# Patient Record
Sex: MALE | Race: AMERICAN INDIAN OR ALASKA NATIVE
[De-identification: names, ages, dates, MRNs, and addresses within clinical notes are randomized per-mention and may not be internally consistent; named-entity substitution may affect disease eponyms.]

---

## 2018-03-12 ENCOUNTER — HOSPITAL ENCOUNTER (EMERGENCY)
Dept: HOSPITAL 31 - C.ER | Age: 23
Discharge: HOME | End: 2018-03-12
Payer: COMMERCIAL

## 2018-03-12 VITALS
SYSTOLIC BLOOD PRESSURE: 121 MMHG | OXYGEN SATURATION: 100 % | HEART RATE: 59 BPM | DIASTOLIC BLOOD PRESSURE: 71 MMHG | TEMPERATURE: 98.3 F | RESPIRATION RATE: 16 BRPM

## 2018-03-12 VITALS — BODY MASS INDEX: 27 KG/M2

## 2018-03-12 DIAGNOSIS — M79.642: Primary | ICD-10-CM

## 2018-03-12 DIAGNOSIS — M79.641: ICD-10-CM

## 2018-03-12 NOTE — RAD
PROCEDURE:  Bilateral hand radiographs.



HISTORY:

left 5th finger, right 34d and 4thin finger



COMPARISON:

None available.



FINDINGS:



BONES:

Right Hand: No acute displaced fracture.



Left Hand: No acute displaced fracture. Postsurgical changes noted 

about the proximal 1st phalanx. 



JOINTS:

Right Hand: No dislocation.



Left Hand: No dislocation.



SOFT TISSUES:

Right Hand: Unremarkable. No evidence of radiopaque foreign body.



Left Hand: Unremarkable. No evidence of radiopaque foreign body.



OTHER FINDINGS:

None.



IMPRESSION:

No acute displaced fracture identified.

## 2018-03-12 NOTE — C.PDOC
History Of Present Illness


22 year old male presents to the ED for evaluation of B/L hand pain for the 

past 2 weeks. Patient reports he has been boxing, stopped hitting the heavy bag 

and was just shadow boxing but his pain has been worsening. Pain is mostly 

located on his left 5th digit and right 2nd and 3rd digits. Patient denies 

weakness, numbness, trauma, fall, injury. 


Time Seen by Provider: 03/12/18 12:07


Chief Complaint (Nursing): Finger,Hand,&Wrist


History Per: Patient


History/Exam Limitations: no limitations


Onset/Duration Of Symptoms: Days


Current Symptoms Are (Timing): Still Present


Quality: "Pain"


Recent travel outside of the [place]: No


Additional History Per: Patient





Past Medical History


Reviewed: Historical Data, Nursing Documentation, Vital Signs


Vital Signs: 


 Last Vital Signs











Temp  98.3 F   03/12/18 11:47


 


Pulse  59 L  03/12/18 11:47


 


Resp  16   03/12/18 11:47


 


BP  121/71   03/12/18 11:47


 


Pulse Ox  100   03/13/18 18:57














- Medical History


PMH: No Chronic Diseases


Surgical History: No Surg Hx


Family History: States: Unknown Family Hx





- Social History


Hx Alcohol Use: No


Hx Substance Use: No





- Immunization History


Hx Tetanus Toxoid Vaccination: No


Hx Influenza Vaccination: No


Hx Pneumococcal Vaccination: No





Review Of Systems


Constitutional: Negative for: Fever, Chills


Musculoskeletal: Positive for: Arm Pain


Skin: Negative for: Rash


Neurological: Negative for: Weakness, Numbness





Physical Exam





- Physical Exam


Appears: Non-toxic, No Acute Distress


Skin: Normal Color, Warm, Dry


Eye(s): bilateral: Normal Inspection


Extremity: Normal ROM, Tenderness (mild tenderness to right  proximal 2nd and 

3rd phalanges and left 5th finger), No Calf Tenderness, Capillary Refill (< 2 

seconds), No Deformity, No Swelling, Other (from at bilateral wrists)


Extremity: Bilateral: Atraumatic


Pulses: Left Radial: Normal, Right Radial: Normal


Neurological/Psych: Oriented x3, Normal Speech, Normal Cognition


Gait: Steady





ED Course And Treatment


O2 Sat by Pulse Oximetry: 100 (On RA)


Pulse Ox Interpretation: Normal





Medical Decision Making


Medical Decision Making: 


Plan:


* B/hands X-Ray


* Motrin 600 mg PO





no acute fracutre seen on xray, d/c home. f/u hand surgeon





Disposition


Counseled Patient/Family Regarding: Studies Performed, Diagnosis, Need For 

Followup, Rx Given





- Disposition


Referrals: 


Rj Werner MD [Staff Provider] - 


Disposition: HOME/ ROUTINE


Disposition Time: 13:52


Condition: GOOD


Additional Instructions: 


Do not box until seen by hand surgeon.  Ibuprofen for pain if needed. 


Prescriptions: 


Ibuprofen [Motrin] 600 mg PO TID #30 tab


Forms:  [facility] [facility] (English), General Discharge Instructions





- Clinical Impression


Clinical Impression: 


 Bilateral hand pain








- PA / NP / Resident Statement


MD/ has reviewed & agrees with the documentation as recorded.





- Scribe Statement


The provider has reviewed the documentation as recorded by the Scribe


Dmitri Patel





All medical record entries made by the Scribe were at my direction and 

personally dictated by me. I have reviewed the chart and agree that the record 

accurately reflects my personal performance of the history, physical exam, 

medical decision making, and the department course for this patient. I have 

also personally directed, reviewed, and agree with the discharge instructions 

and disposition.